# Patient Record
Sex: MALE | Race: WHITE | NOT HISPANIC OR LATINO | URBAN - METROPOLITAN AREA
[De-identification: names, ages, dates, MRNs, and addresses within clinical notes are randomized per-mention and may not be internally consistent; named-entity substitution may affect disease eponyms.]

---

## 2023-04-13 ENCOUNTER — APPOINTMENT (OUTPATIENT)
Dept: RADIOLOGY | Facility: IMAGING CENTER | Age: 88
End: 2023-04-13
Attending: FAMILY MEDICINE
Payer: COMMERCIAL

## 2023-04-13 ENCOUNTER — OFFICE VISIT (OUTPATIENT)
Dept: URGENT CARE | Facility: PHYSICIAN GROUP | Age: 88
End: 2023-04-13
Payer: COMMERCIAL

## 2023-04-13 VITALS
TEMPERATURE: 97.1 F | SYSTOLIC BLOOD PRESSURE: 132 MMHG | WEIGHT: 183 LBS | BODY MASS INDEX: 25.62 KG/M2 | DIASTOLIC BLOOD PRESSURE: 88 MMHG | OXYGEN SATURATION: 96 % | HEIGHT: 71 IN | RESPIRATION RATE: 16 BRPM | HEART RATE: 68 BPM

## 2023-04-13 DIAGNOSIS — S80.01XA CONTUSION OF RIGHT KNEE, INITIAL ENCOUNTER: ICD-10-CM

## 2023-04-13 PROCEDURE — 99203 OFFICE O/P NEW LOW 30 MIN: CPT | Performed by: FAMILY MEDICINE

## 2023-04-13 PROCEDURE — 73564 X-RAY EXAM KNEE 4 OR MORE: CPT | Mod: TC,FY,RT | Performed by: RADIOLOGY

## 2023-04-13 ASSESSMENT — ENCOUNTER SYMPTOMS: FALLS: 1

## 2023-04-13 NOTE — PROGRESS NOTES
"Subjective     Poli Brian is a 87 y.o. male who presents with Fall (X 6 wks ago ) and Knee Injury (Right Knee injury from fall not getting better. Swollen. Has been wearing a brace.  )      - This is a pleasant and nontoxic appearing 87 y.o. male who has come to the walk-in clinic today for:    #1) ~ 6 wks ago slip on ice and fell and hit Rt knee. Still hurts when moves it certain ways or pushes on it. No head trauma/loc or pain/injury reported elsewhere today from this fall       ALLERGIES:  Bloodless and Nkda [no known drug allergy]     PMH:  Past Medical History:   Diagnosis Date    Arthritis     Pain     low back pain        PSH:  Past Surgical History:   Procedure Laterality Date    THORACIC FUSION POSTERIOR  4/26/2011    Performed by VIKTORIYA GARCIA at [facility]    LUMBAR LAMINECTOMY DISKECTOMY  4/26/2011    Performed by VIKTORIYA GARCIA at [facility]    CATARACT PHACO WITH IOL  2007    right eye only    APPENDECTOMY  1965       MEDS:    Current Outpatient Medications:     diclofenac sodium (VOLTAREN) 1 % Gel, Apply 3 g topically 3 times a day as needed (Rt Knee pain)., Disp: 150 g, Rfl: 1    ** I have documented what I find to be significant in regards to past medical, social, family and surgical history  in my HPI or under PMH/PSH/FH review section, otherwise it is noncontributory **         HPI    Review of Systems   Musculoskeletal:  Positive for falls and joint pain.   All other systems reviewed and are negative.           Objective     /88   Pulse 68   Temp 36.2 °C (97.1 °F) (Temporal)   Resp 16   Ht 1.803 m (5' 11\")   Wt 83 kg (183 lb)   SpO2 96%   BMI 25.52 kg/m²      Physical Exam  Vitals and nursing note reviewed.   Constitutional:       General: He is not in acute distress.     Appearance: Normal appearance. He is well-developed.   HENT:      Head: Normocephalic.   Cardiovascular:      Heart sounds: Normal heart sounds. No murmur heard.  Pulmonary:      " Effort: Pulmonary effort is normal. No respiratory distress.   Musculoskeletal:        Legs:       Comments: Rt Knee: no wounds, edema, discoloration noted. Some TTP. Good srom. Ligaments seem intact   Neurological:      Mental Status: He is alert.      Motor: No abnormal muscle tone.   Psychiatric:         Mood and Affect: Mood normal.         Behavior: Behavior normal.                           Assessment & Plan     1. Contusion of right knee, initial encounter  DX-KNEE COMPLETE 4+ RIGHT    diclofenac sodium (VOLTAREN) 1 % Gel    Referral to Orthopedics    Referral to establish with [facility]          - Dx, plan & d/c instructions discussed   - Rest, stay hydrated  - OTC Tylenol as needed      Follow up with [facility] office this week for a recheck on today's visit. ER if not improving in 2-3 days or if feeling/getting worse. (If you do not have a primary care provider and need to schedule one you may call [facility] at [phone] to do this)    Patient left in stable condition     Today's radiology imaging personally reviewed by me today on day of visit and Radiology readings reviewed and discussed w/ patient today.     Pertinent prior lab work and/or imaging studies in Epic have been reviewed by me today on day of this visit.      Pertinent prior office visit notes in [facility] have been reviewed by me today on day of this visit.